# Patient Record
Sex: MALE | Race: WHITE | NOT HISPANIC OR LATINO
[De-identification: names, ages, dates, MRNs, and addresses within clinical notes are randomized per-mention and may not be internally consistent; named-entity substitution may affect disease eponyms.]

---

## 2021-03-01 ENCOUNTER — RESULT REVIEW (OUTPATIENT)
Age: 56
End: 2021-03-01

## 2021-10-02 ENCOUNTER — APPOINTMENT (OUTPATIENT)
Dept: DISASTER EMERGENCY | Facility: CLINIC | Age: 56
End: 2021-10-02

## 2021-10-02 DIAGNOSIS — Z01.818 ENCOUNTER FOR OTHER PREPROCEDURAL EXAMINATION: ICD-10-CM

## 2021-10-02 PROBLEM — Z00.00 ENCOUNTER FOR PREVENTIVE HEALTH EXAMINATION: Status: ACTIVE | Noted: 2021-10-02

## 2021-10-03 LAB — SARS-COV-2 N GENE NPH QL NAA+PROBE: NOT DETECTED

## 2021-12-28 ENCOUNTER — OUTPATIENT (OUTPATIENT)
Dept: OUTPATIENT SERVICES | Facility: HOSPITAL | Age: 56
LOS: 1 days | End: 2021-12-28

## 2022-06-28 ENCOUNTER — RESULT REVIEW (OUTPATIENT)
Age: 57
End: 2022-06-28

## 2023-04-06 ENCOUNTER — APPOINTMENT (OUTPATIENT)
Dept: ORTHOPEDIC SURGERY | Facility: CLINIC | Age: 58
End: 2023-04-06
Payer: MEDICARE

## 2023-04-06 DIAGNOSIS — Z78.9 OTHER SPECIFIED HEALTH STATUS: ICD-10-CM

## 2023-04-06 DIAGNOSIS — Z86.39 PERSONAL HISTORY OF OTHER ENDOCRINE, NUTRITIONAL AND METABOLIC DISEASE: ICD-10-CM

## 2023-04-06 PROCEDURE — 20610 DRAIN/INJ JOINT/BURSA W/O US: CPT | Mod: LT

## 2023-04-06 PROCEDURE — 99213 OFFICE O/P EST LOW 20 MIN: CPT | Mod: 25

## 2023-04-06 PROCEDURE — 73562 X-RAY EXAM OF KNEE 3: CPT | Mod: FY,LT

## 2023-04-06 NOTE — HISTORY OF PRESENT ILLNESS
[Sudden] : sudden [10] : 10 [7] : 7 [Dull/Aching] : dull/aching [Throbbing] : throbbing [Constant] : constant [Household chores] : household chores [Leisure] : leisure [Sleep] : sleep [Social interactions] : social interactions [Rest] : rest [Retired] : Work status: retired [de-identified] : Patient is here for his left knee. Patient states NKI. Patient states the pain started a month ago. Patient states the pain is a constant aching. Patient states he had SX in this knee many years ago by Dr. Alejandre. Patient states he has swelling in his knee and the pain is throughout the entire knee.  [] : Post Surgical Visit: no [FreeTextEntry1] : Left knee [FreeTextEntry3] : 1 month ago  [FreeTextEntry5] : Patient states NKI [de-identified] : Movement

## 2023-04-06 NOTE — PHYSICAL EXAM
[de-identified] : Examination of the left knee is as follows:\par INSPECTION: mild effusion, but no ecchymosis, no erythema, no atrophy, no deformities of quad tendon or of patellar tendon\par PALPATION: medial joint line tenderness, but no palpable mass, no obvious defects, no increased warmth, no calf tenderness\par ROM: flexion 125 degrees, but extension 0 degrees\par STRENGTH: quadriceps 5/5, hamstring 5/5\par TESTING: ligamentously stable\par NEURO: light touch is intact throughout\par GAIT: mildly antalgic, but patient ambulates without assistive device\par \par X-rays of the left knee is as follows: \par Knee 3 view in the anteroposterior, lateral, skyline views: severe tricompartmental OA with medial joint space narrowing, moderate to severe djd of patellofemoral djd, varus alignment

## 2023-04-25 ENCOUNTER — APPOINTMENT (OUTPATIENT)
Dept: ORTHOPEDIC SURGERY | Facility: CLINIC | Age: 58
End: 2023-04-25
Payer: MEDICARE

## 2023-04-25 VITALS — HEIGHT: 69 IN | WEIGHT: 175 LBS | BODY MASS INDEX: 25.92 KG/M2

## 2023-04-25 PROCEDURE — 99214 OFFICE O/P EST MOD 30 MIN: CPT

## 2023-04-25 RX ORDER — MELOXICAM 7.5 MG/1
7.5 TABLET ORAL TWICE DAILY
Qty: 35 | Refills: 0 | Status: ACTIVE | COMMUNITY
Start: 2023-04-25 | End: 1900-01-01

## 2023-04-25 RX ORDER — ATORVASTATIN CALCIUM 20 MG/1
20 TABLET, FILM COATED ORAL
Refills: 0 | Status: ACTIVE | COMMUNITY

## 2023-04-25 NOTE — DISCUSSION/SUMMARY
[Medication Risks Reviewed] : Medication risks reviewed [Surgical risks reviewed] : Surgical risks reviewed [de-identified] : left knee is inflamed, it does not like to change position\par He was informed about 3 month wait for replacement surgery from last injection (April 6, 2023) \par Patient is aware he needs a TKA. Plan is for left TKA and Surgery must be after July 6 2023\par I spent time going in detail the problem and the associated risks/benefits of surgery. detailed complications but not limited to: of nerve injury, non union, repeat fx, dvt/pe postop, instability,transfusion, infection, NVA injury, stiffness, leg length discrepancy, inability to ambulate & death. discussed implant and model shown to patient. answered all patient questions. patient understands procedure and postop directions. Patient has failed conservative treatment and  PT is contraindicated at this time \par \par In the meantime, recommend pt use ice machine; avoiding heat\par Patient will try anti inflammatories- meloxicam for the next week and then occasionally

## 2023-04-25 NOTE — PHYSICAL EXAM
[5___] : hamstring 5[unfilled]/5 [Left] : left knee [AP] : anteroposterior [Lateral] : lateral [[place]] : skyline [] : no deformities of patellar tendon [FreeTextEntry3] : grade 2 effusion [de-identified] : flexed knee gait [FreeTextEntry9] : 4/6/23: medially bone on bone; osteophytes medially, anteriorly, posteriorly, and in the patellofemoral compartment  [TWNoteComboBox7] : flexion 90 degrees [de-identified] : extension 10 degrees

## 2023-04-25 NOTE — REASON FOR VISIT
[FreeTextEntry2] : here today for f/u left knee severe pain. pain 10/10.  saw dr Sparks 4/6/23 and had CSI injection .  has right TKA.  using cane for ambulation.  feels sick to stomach today from pain.  using ice, massage, heat, advil, motrin 600, oxy 5 mg qhs, tylenol

## 2023-05-01 ENCOUNTER — APPOINTMENT (OUTPATIENT)
Dept: ORTHOPEDIC SURGERY | Facility: CLINIC | Age: 58
End: 2023-05-01

## 2023-06-09 ENCOUNTER — APPOINTMENT (OUTPATIENT)
Dept: ORTHOPEDIC SURGERY | Facility: CLINIC | Age: 58
End: 2023-06-09
Payer: MEDICARE

## 2023-06-09 VITALS — WEIGHT: 175 LBS | BODY MASS INDEX: 25.92 KG/M2 | HEIGHT: 69 IN

## 2023-06-09 DIAGNOSIS — Z96.651 PRESENCE OF RIGHT ARTIFICIAL KNEE JOINT: ICD-10-CM

## 2023-06-09 PROCEDURE — 99213 OFFICE O/P EST LOW 20 MIN: CPT

## 2023-06-09 PROCEDURE — 73562 X-RAY EXAM OF KNEE 3: CPT | Mod: RT

## 2023-06-09 RX ORDER — MELOXICAM 7.5 MG/1
7.5 TABLET ORAL
Qty: 30 | Refills: 0 | Status: ACTIVE | COMMUNITY
Start: 2023-06-09 | End: 1900-01-01

## 2023-06-09 NOTE — HISTORY OF PRESENT ILLNESS
[de-identified] : Patient is f/u Rt TKA 3 years ago. Last night the right knee swelled up, reports has been doing a lot of work lately. Reports to have been wrapping and icing the knee since last night. Meloxicam also being taken at this time. Patient is scheduled for LT TKA 7/20/23, would like to r/u any infections/confounding problems.

## 2023-06-09 NOTE — PHYSICAL EXAM
[Right] : right knee [AP] : anteroposterior [Lateral] : lateral [[place]] : skyline [] : no extensor lag [FreeTextEntry9] : Well-aligned, well-fixed prosthesis. No lucency noted. Patella centered. Unremarkable from 11/2020

## 2023-06-09 NOTE — DISCUSSION/SUMMARY
[de-identified] : Continue home strengthening and stretching program consisting of non-impact exercises and ice as needed. \par \par Discussed importance of non-impact exercise and muscle stretching before and after exercise. Reviewed x-rays and knee model. Explained the importance of ice and rest. \par \par Patient can continue Meloxicam at this time and will continue the ice machine\par \par Continue to monitor for any sign of infection\par \par

## 2023-07-06 ENCOUNTER — APPOINTMENT (OUTPATIENT)
Dept: ORTHOPEDIC SURGERY | Facility: CLINIC | Age: 58
End: 2023-07-06
Payer: MEDICARE

## 2023-07-06 VITALS — HEIGHT: 69 IN | BODY MASS INDEX: 25.92 KG/M2 | WEIGHT: 175 LBS

## 2023-07-06 DIAGNOSIS — S46.012A STRAIN OF MUSCLE(S) AND TENDON(S) OF THE ROTATOR CUFF OF LEFT SHOULDER, INITIAL ENCOUNTER: ICD-10-CM

## 2023-07-06 DIAGNOSIS — M54.12 RADICULOPATHY, CERVICAL REGION: ICD-10-CM

## 2023-07-06 PROCEDURE — 73030 X-RAY EXAM OF SHOULDER: CPT | Mod: LT

## 2023-07-06 PROCEDURE — 72040 X-RAY EXAM NECK SPINE 2-3 VW: CPT

## 2023-07-06 PROCEDURE — 99214 OFFICE O/P EST MOD 30 MIN: CPT

## 2023-07-06 RX ORDER — CYCLOBENZAPRINE HYDROCHLORIDE 5 MG/1
5 TABLET, FILM COATED ORAL 3 TIMES DAILY
Qty: 21 | Refills: 1 | Status: ACTIVE | COMMUNITY
Start: 2023-07-06 | End: 1900-01-01

## 2023-07-06 NOTE — HISTORY OF PRESENT ILLNESS
[de-identified] : (History of Present Illness)\par \par Patient age in years is 58\par Occupation is  retired\par \par Body part causing symptoms is the  left shoulder and neck\par Symptoms began 4 weeks ago, was playing golf and felt pain during a swing\par Pain was initially posterior, but now radiates from his shoulder blade down his arm to his fingers\par Location of pain is lateral\par Quality of pain is aching\par Pain score at rest is  4\par Pain score during activity is  7\par Prior treatments include ice\par Patient's condition is not associated with worker’s compensation, no-fault or interscholastic athletics\par Melia has knee surgery in 3 weeks with Dr Rose and cannot take NSAIDs\par

## 2023-07-06 NOTE — IMAGING
[de-identified] : (Physical Exam: Shoulder)\par \par Laterality is left\par \par Patient is in no acute distress, alert and oriented\par Sensation is grossly intact to light touch in the hand\par Motor function is 5/5 in the hand\par Capillary refill is less than 2 seconds in the fingers\par Lymphadenopathy is not present\par Peripheral edema is not present\par \par Skin is intact \par Swelling is not present \par Atrophy is not present\par Scapular winging is not present\par Deformity of the AC joint is not present\par Deformity of the biceps is not present \par \par Bicipital groove tenderness is not present \par AC joint tenderness is not present \par Scapulothoracic tenderness is not present \par \par Active forward elevation is 175\par Passive forward elevation is 175\par External rotation at the side is 80\par Internal rotation behind the back is to the level of T7 \par \par Forward elevation strength is 5/5\par External rotation strength at the side is 5/5 \par Internal rotation strength at the side is 5/5 \par Deltoid strength of anterior, posterior and lateral heads is 5/5\par \par Owens test is abnormal \par O’Akash’s test is abnormal\par Speed’s test is normal\par Cross body adduction test is normal\par Apprehension and relocation is normal\par Sulcus sign is normal\par Belly press test is normal\par \par (Physical Exam: Cervical Spine)\par \par Laterality is bilateral\par \par Skin is intact \par Swelling is not present \par Atrophy is not present\par \par Bony tenderness is not present \par Paraspinal tenderness is not present \par Bony stepoff is not present\par \par Range of motion is normal\par \par Shoulder abduction strength is 5/5\par Elbow flexion strength is 5/5\par Elbow extension strength is 5/5\par Wrist flexion strength is 5/5\par Wrist extension strength is 5/5\par Finger abduction strength is 5/5\par \par C5 sensation is intact\par C6 sensation is intact\par C7 sensation is intact\par C8 sensation is intact\par T1 sensation is intact\par \par Biceps reflex is 2+\par Triceps reflex is 2+\par Patella reflex is 2+\par \par Clonus is not present\par Laura's test is not present \par Spurling's test is normal\par \par  [Straightening consistent with spasm] : Straightening consistent with spasm [No spinal deformity, fracture, lytic lesion, or marked single level collapse] : No spinal deformity, fracture, lytic lesion, or marked single level collapse [Left] : left shoulder [There are no fractures, subluxations or dislocations. No significant abnormalities are seen] : There are no fractures, subluxations or dislocations. No significant abnormalities are seen

## 2023-07-06 NOTE — DISCUSSION/SUMMARY
[de-identified] : (Assessment and Plan)\par \par History, clinical examination and imaging were most consistent with:\par -left shoulder rotator cuff strain\par -cervical radiculopathy\par \par The diagnosis was explained in detail. The potential non-surgical and surgical treatments were reviewed. The relative risks and benefits of each option were considered relative to the patient’s age, activity level, medical history, symptom severity and previously attempted treatments. \par \par The patient was advised to consult with their primary medical provider prior to initiation of any new medications to reduce the risk of adverse effects specific to their long-term home medications and medical history. The risk of gastrointestinal irritation and kidney injury specific to long-term NSAID use was discussed.   \par \par -We discussed the overlap of cervical and shoulder symptoms. He appears to have a component of both. \par -Patient will proceed with formal PT.\par -Cortisone injection is discussed and deferred by the patient at this time. \par -Over the counter acetaminophen as needed for pain. \par -Flexeril as needed for spasms.\par -The added clinical utility of an MRI was discussed. The patient deferred further diagnostic testing at this time.\par -Follow up in 6 weeks. If symptoms persist consider MRI and CSI. \par \par \par (MDM) \par \par Problem Complexity\par -Moderate: acute, complicated injury\par \par Risk\par -Moderate: prescription medication\par \par

## 2023-07-10 ENCOUNTER — FORM ENCOUNTER (OUTPATIENT)
Age: 58
End: 2023-07-10

## 2023-07-13 ENCOUNTER — FORM ENCOUNTER (OUTPATIENT)
Age: 58
End: 2023-07-13

## 2023-07-14 RX ORDER — MUPIROCIN 20 MG/G
2 OINTMENT TOPICAL
Qty: 1 | Refills: 0 | Status: ACTIVE | COMMUNITY
Start: 2023-07-14 | End: 1900-01-01

## 2023-07-17 ENCOUNTER — FORM ENCOUNTER (OUTPATIENT)
Age: 58
End: 2023-07-17

## 2023-07-20 ENCOUNTER — APPOINTMENT (OUTPATIENT)
Dept: ORTHOPEDIC SURGERY | Facility: AMBULATORY SURGERY CENTER | Age: 58
End: 2023-07-20
Payer: MEDICARE

## 2023-07-20 PROCEDURE — 20985 CPTR-ASST DIR MS PX: CPT

## 2023-07-20 PROCEDURE — 27447 TOTAL KNEE ARTHROPLASTY: CPT | Mod: AS,LT

## 2023-07-20 PROCEDURE — 27447 TOTAL KNEE ARTHROPLASTY: CPT | Mod: LT

## 2023-07-20 RX ORDER — FAMOTIDINE 20 MG/1
20 TABLET, FILM COATED ORAL
Qty: 60 | Refills: 0 | Status: ACTIVE | COMMUNITY
Start: 2023-07-20 | End: 1900-01-01

## 2023-07-20 RX ORDER — CEPHALEXIN 500 MG/1
500 CAPSULE ORAL 4 TIMES DAILY
Qty: 4 | Refills: 0 | Status: ACTIVE | COMMUNITY
Start: 2023-07-20 | End: 1900-01-01

## 2023-07-20 RX ORDER — ACETAMINOPHEN EXTRA STRENGTH 500 MG/1
500 TABLET ORAL EVERY 8 HOURS
Qty: 180 | Refills: 0 | Status: ACTIVE | COMMUNITY
Start: 2023-07-20 | End: 1900-01-01

## 2023-07-20 RX ORDER — CELECOXIB 200 MG/1
200 CAPSULE ORAL TWICE DAILY
Qty: 28 | Refills: 0 | Status: ACTIVE | COMMUNITY
Start: 2023-07-20 | End: 1900-01-01

## 2023-07-20 RX ORDER — ASPIRIN 325 MG/1
325 TABLET, COATED ORAL
Qty: 60 | Refills: 0 | Status: ACTIVE | COMMUNITY
Start: 2023-07-20 | End: 1900-01-01

## 2023-08-01 ENCOUNTER — APPOINTMENT (OUTPATIENT)
Dept: ORTHOPEDIC SURGERY | Facility: CLINIC | Age: 58
End: 2023-08-01
Payer: MEDICARE

## 2023-08-01 PROCEDURE — 99024 POSTOP FOLLOW-UP VISIT: CPT

## 2023-08-07 RX ORDER — OXYCODONE 5 MG/1
5 TABLET ORAL
Qty: 42 | Refills: 0 | Status: ACTIVE | COMMUNITY
Start: 2023-07-20 | End: 1900-01-01

## 2023-08-29 ENCOUNTER — APPOINTMENT (OUTPATIENT)
Dept: ORTHOPEDIC SURGERY | Facility: CLINIC | Age: 58
End: 2023-08-29
Payer: MEDICARE

## 2023-08-29 VITALS — BODY MASS INDEX: 25.92 KG/M2 | WEIGHT: 175 LBS | HEIGHT: 69 IN

## 2023-08-29 PROCEDURE — 73562 X-RAY EXAM OF KNEE 3: CPT | Mod: LT

## 2023-08-29 PROCEDURE — 99024 POSTOP FOLLOW-UP VISIT: CPT

## 2023-08-29 NOTE — REASON FOR VISIT
[FreeTextEntry2] : her for f/u left TKA 7/20/23 .  doing well, no assistive devices .  c/p pain around knee cap and swelling.  still icing and going to PT x3/days a week.

## 2023-08-29 NOTE — DISCUSSION/SUMMARY
[de-identified] : Discussed importance of non-impact exercise and muscle stretching before and after exercise. Reviewed x-rays Explained the importance of ice and rest.    Stretching exercises shown, Explained the importance of Range of Motion before strength.  f/u 6 weeks

## 2023-08-29 NOTE — PHYSICAL EXAM
[5___] : hamstring 5[unfilled]/5 [] : non-antalgic [Right] : right knee [AP] : anteroposterior [Lateral] : lateral [[place]] : skyline [FreeTextEntry9] : Well-aligned, well-fixed prosthesis. No lucency noted. Patella centered. Unremarkable from 11/2020 [TWNoteComboBox7] : flexion 110 degrees [de-identified] : extension 3 degrees

## 2023-08-31 ENCOUNTER — APPOINTMENT (OUTPATIENT)
Dept: ORTHOPEDIC SURGERY | Facility: CLINIC | Age: 58
End: 2023-08-31

## 2023-09-01 ENCOUNTER — APPOINTMENT (OUTPATIENT)
Dept: ORTHOPEDIC SURGERY | Facility: CLINIC | Age: 58
End: 2023-09-01

## 2023-09-08 ENCOUNTER — RX ONLY (RX ONLY)
Age: 58
End: 2023-09-08

## 2023-09-08 ENCOUNTER — OFFICE (OUTPATIENT)
Facility: LOCATION | Age: 58
Setting detail: OPHTHALMOLOGY
End: 2023-09-08
Payer: MEDICARE

## 2023-09-08 DIAGNOSIS — H01.004: ICD-10-CM

## 2023-09-08 DIAGNOSIS — H16.223: ICD-10-CM

## 2023-09-08 DIAGNOSIS — H01.001: ICD-10-CM

## 2023-09-08 DIAGNOSIS — H01.002: ICD-10-CM

## 2023-09-08 DIAGNOSIS — H01.005: ICD-10-CM

## 2023-09-08 PROBLEM — H11.153 PINGUECULA; BOTH EYES: Status: ACTIVE | Noted: 2023-09-08

## 2023-09-08 PROCEDURE — 99213 OFFICE O/P EST LOW 20 MIN: CPT | Performed by: OPHTHALMOLOGY

## 2023-09-08 ASSESSMENT — LID EXAM ASSESSMENTS
OD_BLEPHARITIS: T
OS_BLEPHARITIS: T

## 2023-09-08 ASSESSMENT — CONFRONTATIONAL VISUAL FIELD TEST (CVF)
OS_FINDINGS: FULL
OD_FINDINGS: FULL

## 2023-09-08 ASSESSMENT — TONOMETRY: OS_IOP_MMHG: 16

## 2023-09-08 ASSESSMENT — SUPERFICIAL PUNCTATE KERATITIS (SPK)
OD_SPK: T
OS_SPK: T

## 2023-09-11 PROBLEM — H01.002 BLEPHARITIS; RIGHT UPPER LID, RIGHT LOWER LID, LEFT UPPER LID, LEFT LOWER LID: Status: ACTIVE | Noted: 2023-09-08

## 2023-09-11 PROBLEM — H01.005 BLEPHARITIS; RIGHT UPPER LID, RIGHT LOWER LID, LEFT UPPER LID, LEFT LOWER LID: Status: ACTIVE | Noted: 2023-09-08

## 2023-09-11 PROBLEM — H01.001 BLEPHARITIS; RIGHT UPPER LID, RIGHT LOWER LID, LEFT UPPER LID, LEFT LOWER LID: Status: ACTIVE | Noted: 2023-09-08

## 2023-09-11 PROBLEM — H01.004 BLEPHARITIS; RIGHT UPPER LID, RIGHT LOWER LID, LEFT UPPER LID, LEFT LOWER LID: Status: ACTIVE | Noted: 2023-09-08

## 2023-09-11 ASSESSMENT — VISUAL ACUITY
OD_BCVA: 20/20
OS_BCVA: 20/20

## 2023-10-10 ENCOUNTER — APPOINTMENT (OUTPATIENT)
Dept: ORTHOPEDIC SURGERY | Facility: CLINIC | Age: 58
End: 2023-10-10
Payer: MEDICARE

## 2023-10-10 VITALS — WEIGHT: 175 LBS | HEIGHT: 69 IN | BODY MASS INDEX: 25.92 KG/M2

## 2023-10-10 DIAGNOSIS — M76.32 ILIOTIBIAL BAND SYNDROME, LEFT LEG: ICD-10-CM

## 2023-10-10 DIAGNOSIS — M17.12 UNILATERAL PRIMARY OSTEOARTHRITIS, LEFT KNEE: ICD-10-CM

## 2023-10-10 PROCEDURE — 73562 X-RAY EXAM OF KNEE 3: CPT | Mod: LT

## 2023-10-10 PROCEDURE — 99213 OFFICE O/P EST LOW 20 MIN: CPT | Mod: 24

## 2023-10-11 ENCOUNTER — OFFICE (OUTPATIENT)
Facility: LOCATION | Age: 58
Setting detail: OPHTHALMOLOGY
End: 2023-10-11
Payer: MEDICARE

## 2023-10-11 DIAGNOSIS — H16.223: ICD-10-CM

## 2023-10-11 PROBLEM — H01.001 BLEPHARITIS; RIGHT UPPER LID, RIGHT LOWER LID, LEFT UPPER LID, LEFT LOWER LID: Status: ACTIVE | Noted: 2023-10-11

## 2023-10-11 PROBLEM — H01.002 BLEPHARITIS; RIGHT UPPER LID, RIGHT LOWER LID, LEFT UPPER LID, LEFT LOWER LID: Status: ACTIVE | Noted: 2023-10-11

## 2023-10-11 PROBLEM — H01.004 BLEPHARITIS; RIGHT UPPER LID, RIGHT LOWER LID, LEFT UPPER LID, LEFT LOWER LID: Status: ACTIVE | Noted: 2023-10-11

## 2023-10-11 PROBLEM — H01.005 BLEPHARITIS; RIGHT UPPER LID, RIGHT LOWER LID, LEFT UPPER LID, LEFT LOWER LID: Status: ACTIVE | Noted: 2023-10-11

## 2023-10-11 PROCEDURE — 99213 OFFICE O/P EST LOW 20 MIN: CPT | Performed by: OPHTHALMOLOGY

## 2023-10-11 ASSESSMENT — SUPERFICIAL PUNCTATE KERATITIS (SPK)
OS_SPK: T
OD_SPK: T

## 2023-10-11 ASSESSMENT — CONFRONTATIONAL VISUAL FIELD TEST (CVF)
OS_FINDINGS: FULL
OD_FINDINGS: FULL

## 2023-10-11 ASSESSMENT — LID EXAM ASSESSMENTS
OS_BLEPHARITIS: T
OD_BLEPHARITIS: T

## 2023-10-18 ASSESSMENT — VISUAL ACUITY
OD_BCVA: 20/20
OS_BCVA: 20/20

## 2024-01-09 ENCOUNTER — APPOINTMENT (OUTPATIENT)
Dept: ORTHOPEDIC SURGERY | Facility: CLINIC | Age: 59
End: 2024-01-09

## 2024-02-23 ENCOUNTER — APPOINTMENT (OUTPATIENT)
Dept: ORTHOPEDIC SURGERY | Facility: CLINIC | Age: 59
End: 2024-02-23
Payer: MEDICARE

## 2024-02-23 DIAGNOSIS — M76.52 PATELLAR TENDINITIS, LEFT KNEE: ICD-10-CM

## 2024-02-23 DIAGNOSIS — Z96.652 PRESENCE OF LEFT ARTIFICIAL KNEE JOINT: ICD-10-CM

## 2024-02-23 PROCEDURE — 99213 OFFICE O/P EST LOW 20 MIN: CPT

## 2024-02-23 PROCEDURE — 73562 X-RAY EXAM OF KNEE 3: CPT | Mod: LT

## 2024-02-23 NOTE — DISCUSSION/SUMMARY
[de-identified] : Discussed importance of non-impact exercise and muscle stretching before and after exercise. Reviewed x-rays . Explained the importance of ice and rest.  todays plan is for patient to procced with PT at this time for left knee pain and f/u in 6 weeks

## 2024-02-23 NOTE — PHYSICAL EXAM
[NL (0)] : extension 0 degrees [5___] : hamstring 5[unfilled]/5 [] : patient ambulates without assistive device [Left] : left knee [All Views] : anteroposterior, lateral, skyline, and anteroposterior standing [Components well fixed, in good position] : Components well fixed, in good position [TWNoteComboBox7] : flexion 110 degrees

## 2024-02-23 NOTE — REASON FOR VISIT
[FreeTextEntry2] : f/u on left TKA 7/20/23, patient states knee is achy and having trouble bending. using oxy for pain prn - last 2 weeks ago. doing alot of walking .

## 2024-04-02 ENCOUNTER — APPOINTMENT (OUTPATIENT)
Dept: ORTHOPEDIC SURGERY | Facility: CLINIC | Age: 59
End: 2024-04-02

## 2024-06-11 ENCOUNTER — OFFICE (OUTPATIENT)
Facility: LOCATION | Age: 59
Setting detail: OPHTHALMOLOGY
End: 2024-06-11
Payer: MEDICARE

## 2024-06-11 DIAGNOSIS — B30.9: ICD-10-CM

## 2024-06-11 PROCEDURE — 99213 OFFICE O/P EST LOW 20 MIN: CPT | Performed by: OPHTHALMOLOGY

## 2024-06-11 ASSESSMENT — CONFRONTATIONAL VISUAL FIELD TEST (CVF)
OS_FINDINGS: FULL
OD_FINDINGS: FULL

## 2024-06-11 ASSESSMENT — LID EXAM ASSESSMENTS
OD_BLEPHARITIS: RLL RUL T
OS_BLEPHARITIS: LLL LUL T

## 2024-06-11 ASSESSMENT — LID POSITION - PTOSIS: OS_PTOSIS: LUL T

## 2024-06-12 ENCOUNTER — RX ONLY (RX ONLY)
Age: 59
End: 2024-06-12

## 2024-06-18 ENCOUNTER — OFFICE (OUTPATIENT)
Facility: LOCATION | Age: 59
Setting detail: OPHTHALMOLOGY
End: 2024-06-18
Payer: MEDICARE

## 2024-06-18 DIAGNOSIS — H01.001: ICD-10-CM

## 2024-06-18 DIAGNOSIS — H01.005: ICD-10-CM

## 2024-06-18 DIAGNOSIS — B30.9: ICD-10-CM

## 2024-06-18 DIAGNOSIS — H01.002: ICD-10-CM

## 2024-06-18 DIAGNOSIS — H01.004: ICD-10-CM

## 2024-06-18 DIAGNOSIS — H02.403: ICD-10-CM
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
## 2024-06-18 PROBLEM — H25.13 CATARACT SENILE NUCLEAR SCLEROSIS; BOTH EYES: Status: ACTIVE | Noted: 2024-06-11

## 2024-06-18 PROBLEM — H10.433 CONJUNCTIVITIS CHRONIC FOLLICULAR; BOTH EYES: Status: ACTIVE | Noted: 2024-06-11

## 2024-06-18 PROCEDURE — OCUSOFT LI OCUSOFT: Performed by: OPHTHALMOLOGY

## 2024-06-18 PROCEDURE — 99213 OFFICE O/P EST LOW 20 MIN: CPT | Performed by: OPHTHALMOLOGY

## 2024-06-18 ASSESSMENT — LID POSITION - PTOSIS
OS_PTOSIS: LUL T 1+
OD_PTOSIS: RUL T

## 2024-06-18 ASSESSMENT — LID EXAM ASSESSMENTS
OS_BLEPHARITIS: LLL LUL T
OD_BLEPHARITIS: RLL RUL T

## 2024-10-01 ENCOUNTER — OFFICE (OUTPATIENT)
Facility: LOCATION | Age: 59
Setting detail: OPHTHALMOLOGY
End: 2024-10-01
Payer: MEDICARE

## 2024-10-01 DIAGNOSIS — H01.002: ICD-10-CM

## 2024-10-01 DIAGNOSIS — H01.004: ICD-10-CM

## 2024-10-01 DIAGNOSIS — H01.001: ICD-10-CM

## 2024-10-01 DIAGNOSIS — B30.9: ICD-10-CM

## 2024-10-01 DIAGNOSIS — T15.11XA: ICD-10-CM

## 2024-10-01 DIAGNOSIS — H01.005: ICD-10-CM

## 2024-10-01 PROCEDURE — 99213 OFFICE O/P EST LOW 20 MIN: CPT | Mod: 25 | Performed by: OPHTHALMOLOGY

## 2024-10-01 PROCEDURE — 67820 REVISE EYELASHES: CPT | Mod: RT | Performed by: OPHTHALMOLOGY

## 2024-10-01 ASSESSMENT — LID POSITION - PTOSIS
OS_PTOSIS: LUL T 1+
OD_PTOSIS: RUL T

## 2024-10-01 ASSESSMENT — SUPERFICIAL PUNCTATE KERATITIS (SPK)
OS_SPK: T
OD_SPK: T

## 2024-10-01 ASSESSMENT — LID EXAM ASSESSMENTS
OD_BLEPHARITIS: RLL RUL T
OS_BLEPHARITIS: LLL LUL T

## 2024-10-01 ASSESSMENT — CONFRONTATIONAL VISUAL FIELD TEST (CVF)
OD_FINDINGS: FULL
OS_FINDINGS: FULL

## 2024-10-04 ASSESSMENT — VISUAL ACUITY
OS_BCVA: 20/25-1
OD_BCVA: 20/25

## 2024-10-15 ENCOUNTER — OFFICE (OUTPATIENT)
Facility: LOCATION | Age: 59
Setting detail: OPHTHALMOLOGY
End: 2024-10-15
Payer: MEDICARE

## 2024-10-15 ENCOUNTER — RX ONLY (RX ONLY)
Age: 59
End: 2024-10-15

## 2024-10-15 DIAGNOSIS — H01.001: ICD-10-CM

## 2024-10-15 DIAGNOSIS — H01.002: ICD-10-CM

## 2024-10-15 DIAGNOSIS — B30.9: ICD-10-CM

## 2024-10-15 DIAGNOSIS — H01.004: ICD-10-CM

## 2024-10-15 DIAGNOSIS — H01.005: ICD-10-CM

## 2024-10-15 PROBLEM — T15.11XA FOREIGN BODY, CONJUNCTIVAL; RIGHT EYE INITIAL ENCOUNTER: Status: RESOLVED | Noted: 2024-10-01 | Resolved: 2024-10-15

## 2024-10-15 PROCEDURE — 99213 OFFICE O/P EST LOW 20 MIN: CPT | Performed by: OPHTHALMOLOGY

## 2024-10-15 ASSESSMENT — LID EXAM ASSESSMENTS
OS_BLEPHARITIS: LLL LUL T
OD_BLEPHARITIS: RLL RUL T

## 2024-10-15 ASSESSMENT — CONFRONTATIONAL VISUAL FIELD TEST (CVF)
OS_FINDINGS: FULL
OD_FINDINGS: FULL

## 2024-10-15 ASSESSMENT — LID POSITION - PTOSIS
OD_PTOSIS: RUL T
OS_PTOSIS: LUL T 1+

## 2024-10-15 ASSESSMENT — SUPERFICIAL PUNCTATE KERATITIS (SPK)
OS_SPK: T
OD_SPK: T

## 2024-10-16 ASSESSMENT — VISUAL ACUITY
OS_BCVA: 20/20-2
OD_BCVA: 20/25+2

## 2024-10-29 ENCOUNTER — OFFICE (OUTPATIENT)
Facility: LOCATION | Age: 59
Setting detail: OPHTHALMOLOGY
End: 2024-10-29
Payer: MEDICARE

## 2024-10-29 DIAGNOSIS — H01.004: ICD-10-CM

## 2024-10-29 DIAGNOSIS — H01.005: ICD-10-CM

## 2024-10-29 DIAGNOSIS — H01.002: ICD-10-CM

## 2024-10-29 DIAGNOSIS — H16.223: ICD-10-CM

## 2024-10-29 DIAGNOSIS — H01.001: ICD-10-CM

## 2024-10-29 DIAGNOSIS — H02.89: ICD-10-CM

## 2024-10-29 PROCEDURE — 99213 OFFICE O/P EST LOW 20 MIN: CPT | Performed by: OPHTHALMOLOGY

## 2024-10-29 ASSESSMENT — SUPERFICIAL PUNCTATE KERATITIS (SPK)
OS_SPK: T
OD_SPK: T

## 2024-10-29 ASSESSMENT — LID EXAM ASSESSMENTS
OD_BLEPHARITIS: RLL RUL T
OS_BLEPHARITIS: LLL LUL T

## 2024-10-29 ASSESSMENT — LID POSITION - PTOSIS
OS_PTOSIS: LUL T 1+
OD_PTOSIS: RUL T

## 2024-10-30 PROBLEM — H02.89 FLOPPY EYELID SYNDROME: Status: ACTIVE | Noted: 2024-10-29

## 2024-10-30 ASSESSMENT — VISUAL ACUITY
OS_BCVA: 20/20
OD_BCVA: 20/25

## 2024-12-20 ENCOUNTER — OFFICE (OUTPATIENT)
Facility: LOCATION | Age: 59
Setting detail: OPHTHALMOLOGY
End: 2024-12-20
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
## 2024-12-20 PROCEDURE — NO SHOW FE NO SHOW FEE: Performed by: OPHTHALMOLOGY

## 2025-01-07 ENCOUNTER — OFFICE (OUTPATIENT)
Facility: LOCATION | Age: 60
Setting detail: OPHTHALMOLOGY
End: 2025-01-07
Payer: MEDICARE

## 2025-01-07 ENCOUNTER — RX ONLY (RX ONLY)
Age: 60
End: 2025-01-07

## 2025-01-07 DIAGNOSIS — B30.9: ICD-10-CM

## 2025-01-07 PROCEDURE — 99213 OFFICE O/P EST LOW 20 MIN: CPT | Performed by: OPHTHALMOLOGY

## 2025-01-07 ASSESSMENT — SUPERFICIAL PUNCTATE KERATITIS (SPK)
OD_SPK: T
OS_SPK: T

## 2025-01-13 ASSESSMENT — VISUAL ACUITY
OS_BCVA: 20/30+2
OD_BCVA: 20/25-2

## 2025-07-18 ENCOUNTER — OFFICE (OUTPATIENT)
Facility: LOCATION | Age: 60
Setting detail: OPHTHALMOLOGY
End: 2025-07-18
Payer: MEDICARE

## 2025-07-18 DIAGNOSIS — H01.004: ICD-10-CM

## 2025-07-18 DIAGNOSIS — B30.9: ICD-10-CM

## 2025-07-18 DIAGNOSIS — H01.001: ICD-10-CM

## 2025-07-18 DIAGNOSIS — H01.002: ICD-10-CM

## 2025-07-18 DIAGNOSIS — H01.005: ICD-10-CM

## 2025-07-18 PROCEDURE — 99213 OFFICE O/P EST LOW 20 MIN: CPT | Performed by: OPHTHALMOLOGY

## 2025-07-18 ASSESSMENT — LID EXAM ASSESSMENTS
OD_BLEPHARITIS: RLL RUL T
OS_BLEPHARITIS: LLL LUL T

## 2025-07-18 ASSESSMENT — LID POSITION - PTOSIS
OS_PTOSIS: LUL T 1+
OD_PTOSIS: RUL T

## 2025-07-18 ASSESSMENT — SUPERFICIAL PUNCTATE KERATITIS (SPK)
OD_SPK: T
OS_SPK: T

## 2025-07-18 ASSESSMENT — VISUAL ACUITY
OD_BCVA: 20/20
OS_BCVA: 20/20

## 2025-08-09 ENCOUNTER — OFFICE (OUTPATIENT)
Facility: LOCATION | Age: 60
Setting detail: OPHTHALMOLOGY
End: 2025-08-09
Payer: MEDICARE

## 2025-08-09 DIAGNOSIS — H01.001: ICD-10-CM

## 2025-08-09 DIAGNOSIS — B88.0: ICD-10-CM

## 2025-08-09 DIAGNOSIS — H01.002: ICD-10-CM

## 2025-08-09 DIAGNOSIS — H01.004: ICD-10-CM

## 2025-08-09 DIAGNOSIS — H01.005: ICD-10-CM

## 2025-08-09 DIAGNOSIS — H40.042: ICD-10-CM

## 2025-08-09 PROCEDURE — 99213 OFFICE O/P EST LOW 20 MIN: CPT | Performed by: OPHTHALMOLOGY

## 2025-08-09 ASSESSMENT — LID EXAM ASSESSMENTS
OD_BLEPHARITIS: RLL RUL T
OS_BLEPHARITIS: LLL LUL T

## 2025-08-09 ASSESSMENT — SUPERFICIAL PUNCTATE KERATITIS (SPK)
OD_SPK: T
OS_SPK: T

## 2025-08-09 ASSESSMENT — TONOMETRY: OD_IOP_MMHG: 17

## 2025-08-09 ASSESSMENT — LID POSITION - PTOSIS
OD_PTOSIS: RUL T
OS_PTOSIS: LUL T 1+

## 2025-08-11 ASSESSMENT — VISUAL ACUITY
OD_BCVA: 20/20
OS_BCVA: 20/20

## 2025-08-15 ENCOUNTER — RX ONLY (RX ONLY)
Age: 60
End: 2025-08-15

## 2025-08-15 ENCOUNTER — OFFICE (OUTPATIENT)
Facility: LOCATION | Age: 60
Setting detail: OPHTHALMOLOGY
End: 2025-08-15
Payer: MEDICARE

## 2025-08-15 DIAGNOSIS — H01.001: ICD-10-CM

## 2025-08-15 DIAGNOSIS — H01.005: ICD-10-CM

## 2025-08-15 DIAGNOSIS — H01.002: ICD-10-CM

## 2025-08-15 DIAGNOSIS — H40.042: ICD-10-CM

## 2025-08-15 DIAGNOSIS — B88.0: ICD-10-CM

## 2025-08-15 DIAGNOSIS — H01.004: ICD-10-CM

## 2025-08-15 PROBLEM — H25.13 CATARACT NUCLEAR SCLEROSIS AGE RELATED; BOTH EYES: Status: ACTIVE | Noted: 2025-08-09

## 2025-08-15 PROBLEM — H40.043: Status: ACTIVE | Noted: 2025-08-09

## 2025-08-15 PROBLEM — L71.0 ROSACEA: Status: ACTIVE | Noted: 2025-08-09

## 2025-08-15 PROCEDURE — 99213 OFFICE O/P EST LOW 20 MIN: CPT | Performed by: OPHTHALMOLOGY

## 2025-08-15 ASSESSMENT — TONOMETRY
OD_IOP_MMHG: 14
OS_IOP_MMHG: 21

## 2025-08-15 ASSESSMENT — LID EXAM ASSESSMENTS
OD_BLEPHARITIS: RLL RUL T
OS_BLEPHARITIS: LLL LUL T

## 2025-08-15 ASSESSMENT — LID POSITION - PTOSIS
OS_PTOSIS: LUL T 1+
OD_PTOSIS: RUL T

## 2025-08-15 ASSESSMENT — VISUAL ACUITY
OS_BCVA: 20/20
OD_BCVA: 20/20

## 2025-08-15 ASSESSMENT — SUPERFICIAL PUNCTATE KERATITIS (SPK)
OS_SPK: T
OD_SPK: T

## 2025-08-29 ENCOUNTER — APPOINTMENT (OUTPATIENT)
Dept: ORTHOPEDIC SURGERY | Facility: CLINIC | Age: 60
End: 2025-08-29
Payer: MEDICARE

## 2025-08-29 DIAGNOSIS — M77.01 MEDIAL EPICONDYLITIS, RIGHT ELBOW: ICD-10-CM

## 2025-08-29 PROCEDURE — 99214 OFFICE O/P EST MOD 30 MIN: CPT

## 2025-08-29 PROCEDURE — 73080 X-RAY EXAM OF ELBOW: CPT | Mod: RT

## 2025-08-29 RX ORDER — MELOXICAM 15 MG/1
15 TABLET ORAL
Qty: 30 | Refills: 2 | Status: ACTIVE | COMMUNITY
Start: 2025-08-29 | End: 1900-01-01

## 2025-08-29 RX ORDER — DICLOFENAC SODIUM 10 MG/G
1 GEL TOPICAL
Qty: 100 | Refills: 0 | Status: ACTIVE | COMMUNITY
Start: 2025-08-29 | End: 1900-01-01